# Patient Record
Sex: MALE | Race: ASIAN | NOT HISPANIC OR LATINO | URBAN - METROPOLITAN AREA
[De-identification: names, ages, dates, MRNs, and addresses within clinical notes are randomized per-mention and may not be internally consistent; named-entity substitution may affect disease eponyms.]

---

## 2017-05-19 ENCOUNTER — EMERGENCY (EMERGENCY)
Facility: HOSPITAL | Age: 32
LOS: 0 days | Discharge: HOME | End: 2017-05-19
Attending: EMERGENCY MEDICINE
Payer: MEDICAID

## 2017-05-19 VITALS
SYSTOLIC BLOOD PRESSURE: 100 MMHG | DIASTOLIC BLOOD PRESSURE: 46 MMHG | HEART RATE: 87 BPM | TEMPERATURE: 99 F | RESPIRATION RATE: 18 BRPM | OXYGEN SATURATION: 98 %

## 2017-05-19 VITALS
RESPIRATION RATE: 18 BRPM | HEIGHT: 70 IN | WEIGHT: 175.05 LBS | HEART RATE: 118 BPM | SYSTOLIC BLOOD PRESSURE: 125 MMHG | TEMPERATURE: 102 F | DIASTOLIC BLOOD PRESSURE: 75 MMHG | OXYGEN SATURATION: 98 %

## 2017-05-19 DIAGNOSIS — R52 PAIN, UNSPECIFIED: ICD-10-CM

## 2017-05-19 DIAGNOSIS — J11.1 INFLUENZA DUE TO UNIDENTIFIED INFLUENZA VIRUS WITH OTHER RESPIRATORY MANIFESTATIONS: ICD-10-CM

## 2017-05-19 DIAGNOSIS — R50.9 FEVER, UNSPECIFIED: ICD-10-CM

## 2017-05-19 DIAGNOSIS — R51 HEADACHE: ICD-10-CM

## 2017-05-19 DIAGNOSIS — J06.9 ACUTE UPPER RESPIRATORY INFECTION, UNSPECIFIED: ICD-10-CM

## 2017-05-19 LAB
ALBUMIN SERPL ELPH-MCNC: 3.8 G/DL — SIGNIFICANT CHANGE UP (ref 3.3–5)
ALP SERPL-CCNC: 59 U/L — SIGNIFICANT CHANGE UP (ref 40–120)
ALT FLD-CCNC: 23 U/L — SIGNIFICANT CHANGE UP (ref 12–78)
ANION GAP SERPL CALC-SCNC: 9 MMOL/L — SIGNIFICANT CHANGE UP (ref 5–17)
APPEARANCE UR: CLEAR — SIGNIFICANT CHANGE UP
APTT BLD: 33.3 SEC — SIGNIFICANT CHANGE UP (ref 27.5–37.4)
AST SERPL-CCNC: 20 U/L — SIGNIFICANT CHANGE UP (ref 15–37)
BACTERIA # UR AUTO: ABNORMAL
BASOPHILS # BLD AUTO: 0.1 K/UL — SIGNIFICANT CHANGE UP (ref 0–0.2)
BASOPHILS NFR BLD AUTO: 0.8 % — SIGNIFICANT CHANGE UP (ref 0–2)
BILIRUB SERPL-MCNC: 0.4 MG/DL — SIGNIFICANT CHANGE UP (ref 0.2–1.2)
BILIRUB UR-MCNC: NEGATIVE — SIGNIFICANT CHANGE UP
BLD GP AB SCN SERPL QL: SIGNIFICANT CHANGE UP
BUN SERPL-MCNC: 10 MG/DL — SIGNIFICANT CHANGE UP (ref 7–23)
CALCIUM SERPL-MCNC: 8.1 MG/DL — LOW (ref 8.5–10.1)
CHLORIDE SERPL-SCNC: 106 MMOL/L — SIGNIFICANT CHANGE UP (ref 96–108)
CK MB BLD-MCNC: 0.6 % — SIGNIFICANT CHANGE UP (ref 0–3.5)
CK MB CFR SERPL CALC: 0.7 NG/ML — SIGNIFICANT CHANGE UP (ref 0.5–3.6)
CK SERPL-CCNC: 108 U/L — SIGNIFICANT CHANGE UP (ref 26–308)
CO2 SERPL-SCNC: 26 MMOL/L — SIGNIFICANT CHANGE UP (ref 22–31)
COLOR SPEC: YELLOW — SIGNIFICANT CHANGE UP
CREAT SERPL-MCNC: 1 MG/DL — SIGNIFICANT CHANGE UP (ref 0.5–1.3)
DIFF PNL FLD: ABNORMAL
EOSINOPHIL # BLD AUTO: 0.1 K/UL — SIGNIFICANT CHANGE UP (ref 0–0.5)
EOSINOPHIL NFR BLD AUTO: 1.8 % — SIGNIFICANT CHANGE UP (ref 0–6)
FLUAV SPEC QL CULT: NEGATIVE — SIGNIFICANT CHANGE UP
FLUBV AG SPEC QL IA: NEGATIVE — SIGNIFICANT CHANGE UP
GLUCOSE SERPL-MCNC: 107 MG/DL — HIGH (ref 70–99)
GLUCOSE UR QL: NEGATIVE MG/DL — SIGNIFICANT CHANGE UP
HCT VFR BLD CALC: 36.6 % — LOW (ref 39–50)
HGB BLD-MCNC: 13.6 G/DL — SIGNIFICANT CHANGE UP (ref 13–17)
INR BLD: 1.18 RATIO — HIGH (ref 0.88–1.16)
KETONES UR-MCNC: NEGATIVE — SIGNIFICANT CHANGE UP
LACTATE SERPL-SCNC: 0.7 MMOL/L — SIGNIFICANT CHANGE UP (ref 0.7–2)
LEUKOCYTE ESTERASE UR-ACNC: NEGATIVE — SIGNIFICANT CHANGE UP
LIDOCAIN IGE QN: 141 U/L — SIGNIFICANT CHANGE UP (ref 73–393)
LYMPHOCYTES # BLD AUTO: 0.6 K/UL — LOW (ref 1–3.3)
LYMPHOCYTES # BLD AUTO: 9 % — LOW (ref 13–44)
MCHC RBC-ENTMCNC: 32.3 PG — SIGNIFICANT CHANGE UP (ref 27–34)
MCHC RBC-ENTMCNC: 37.2 GM/DL — HIGH (ref 32–36)
MCV RBC AUTO: 86.7 FL — SIGNIFICANT CHANGE UP (ref 80–100)
MONOCYTES # BLD AUTO: 0.6 K/UL — SIGNIFICANT CHANGE UP (ref 0–0.9)
MONOCYTES NFR BLD AUTO: 8.8 % — SIGNIFICANT CHANGE UP (ref 2–14)
NEUTROPHILS # BLD AUTO: 5.5 K/UL — SIGNIFICANT CHANGE UP (ref 1.8–7.4)
NEUTROPHILS NFR BLD AUTO: 79.5 % — HIGH (ref 43–77)
NITRITE UR-MCNC: NEGATIVE — SIGNIFICANT CHANGE UP
PH UR: 5 — SIGNIFICANT CHANGE UP (ref 5–8)
PLATELET # BLD AUTO: 146 K/UL — LOW (ref 150–400)
POTASSIUM SERPL-MCNC: 3.6 MMOL/L — SIGNIFICANT CHANGE UP (ref 3.5–5.3)
POTASSIUM SERPL-SCNC: 3.6 MMOL/L — SIGNIFICANT CHANGE UP (ref 3.5–5.3)
PROT SERPL-MCNC: 6.9 GM/DL — SIGNIFICANT CHANGE UP (ref 6–8.3)
PROT UR-MCNC: NEGATIVE MG/DL — SIGNIFICANT CHANGE UP
PROTHROM AB SERPL-ACNC: 12.9 SEC — HIGH (ref 9.8–12.7)
RBC # BLD: 4.22 M/UL — SIGNIFICANT CHANGE UP (ref 4.2–5.8)
RBC # FLD: 11.2 % — SIGNIFICANT CHANGE UP (ref 11–15)
RBC CASTS # UR COMP ASSIST: ABNORMAL /HPF (ref 0–4)
SODIUM SERPL-SCNC: 141 MMOL/L — SIGNIFICANT CHANGE UP (ref 135–145)
SP GR SPEC: 1.02 — SIGNIFICANT CHANGE UP (ref 1.01–1.02)
TROPONIN I SERPL-MCNC: <.015 NG/ML — SIGNIFICANT CHANGE UP (ref 0.01–0.04)
UROBILINOGEN FLD QL: NEGATIVE MG/DL — SIGNIFICANT CHANGE UP
WBC # BLD: 7 K/UL — SIGNIFICANT CHANGE UP (ref 3.8–10.5)
WBC # FLD AUTO: 7 K/UL — SIGNIFICANT CHANGE UP (ref 3.8–10.5)

## 2017-05-19 PROCEDURE — 99284 EMERGENCY DEPT VISIT MOD MDM: CPT | Mod: 25

## 2017-05-19 PROCEDURE — 71010: CPT | Mod: 26

## 2017-05-19 RX ORDER — IBUPROFEN 200 MG
1 TABLET ORAL
Qty: 20 | Refills: 0 | OUTPATIENT
Start: 2017-05-19

## 2017-05-19 RX ORDER — ACETAMINOPHEN 500 MG
1 TABLET ORAL
Qty: 20 | Refills: 0 | OUTPATIENT
Start: 2017-05-19

## 2017-05-19 RX ORDER — AZITHROMYCIN 500 MG/1
1 TABLET, FILM COATED ORAL
Qty: 4 | Refills: 0 | OUTPATIENT
Start: 2017-05-19 | End: 2017-05-23

## 2017-05-19 RX ORDER — IBUPROFEN 200 MG
600 TABLET ORAL ONCE
Qty: 0 | Refills: 0 | Status: COMPLETED | OUTPATIENT
Start: 2017-05-19 | End: 2017-05-19

## 2017-05-19 RX ORDER — AZITHROMYCIN 500 MG/1
500 TABLET, FILM COATED ORAL ONCE
Qty: 0 | Refills: 0 | Status: COMPLETED | OUTPATIENT
Start: 2017-05-19 | End: 2017-05-19

## 2017-05-19 RX ORDER — SODIUM CHLORIDE 9 MG/ML
3 INJECTION INTRAMUSCULAR; INTRAVENOUS; SUBCUTANEOUS ONCE
Qty: 0 | Refills: 0 | Status: COMPLETED | OUTPATIENT
Start: 2017-05-19 | End: 2017-05-19

## 2017-05-19 RX ORDER — SODIUM CHLORIDE 9 MG/ML
1000 INJECTION INTRAMUSCULAR; INTRAVENOUS; SUBCUTANEOUS
Qty: 0 | Refills: 0 | Status: COMPLETED | OUTPATIENT
Start: 2017-05-19 | End: 2017-05-19

## 2017-05-19 RX ADMIN — SODIUM CHLORIDE 2000 MILLILITER(S): 9 INJECTION INTRAMUSCULAR; INTRAVENOUS; SUBCUTANEOUS at 04:23

## 2017-05-19 RX ADMIN — AZITHROMYCIN 255 MILLIGRAM(S): 500 TABLET, FILM COATED ORAL at 07:41

## 2017-05-19 RX ADMIN — SODIUM CHLORIDE 3 MILLILITER(S): 9 INJECTION INTRAMUSCULAR; INTRAVENOUS; SUBCUTANEOUS at 03:30

## 2017-05-19 RX ADMIN — SODIUM CHLORIDE 2000 MILLILITER(S): 9 INJECTION INTRAMUSCULAR; INTRAVENOUS; SUBCUTANEOUS at 03:22

## 2017-05-19 RX ADMIN — Medication 75 MILLIGRAM(S): at 07:40

## 2017-05-19 RX ADMIN — Medication 600 MILLIGRAM(S): at 04:22

## 2017-05-19 RX ADMIN — Medication 600 MILLIGRAM(S): at 07:19

## 2017-05-19 NOTE — ED PROVIDER NOTE - OBJECTIVE STATEMENT
31yoM; with no signif pmh; now p/w fever, bodyaches, cough x1 day. denies headache. denies neck stiffness. c/o abd pain--epigastric, cramping, intermittant, associated with n/v (x3-4 episodes). denies diarrhea. denies sick contacts. denies travel.

## 2017-05-19 NOTE — ED ADULT NURSE REASSESSMENT NOTE - NS ED NURSE REASSESS COMMENT FT1
ao x3 , medicated as ordered , d/ salma rack , will be discharge after infusion of antibiotic azithromycin 500mg ivpb infusing. patient stable, smiling , eager to go home. will continue to monitor

## 2017-05-19 NOTE — ED ADULT NURSE NOTE - CAS EDN DISCHARGE ASSESSMENT
No adverse reaction to first time med in ED/Awake/Dressing clean and dry/Alert and oriented to person, place and time/Symptoms improved

## 2017-05-31 ENCOUNTER — EMERGENCY (EMERGENCY)
Facility: HOSPITAL | Age: 32
LOS: 0 days | Discharge: HOME | End: 2017-06-01
Attending: EMERGENCY MEDICINE
Payer: MEDICAID

## 2017-05-31 VITALS
DIASTOLIC BLOOD PRESSURE: 65 MMHG | RESPIRATION RATE: 17 BRPM | HEART RATE: 91 BPM | TEMPERATURE: 100 F | WEIGHT: 175.05 LBS | OXYGEN SATURATION: 100 % | HEIGHT: 70 IN | SYSTOLIC BLOOD PRESSURE: 103 MMHG

## 2017-05-31 PROCEDURE — 99285 EMERGENCY DEPT VISIT HI MDM: CPT | Mod: 25

## 2017-06-01 VITALS
SYSTOLIC BLOOD PRESSURE: 110 MMHG | RESPIRATION RATE: 18 BRPM | TEMPERATURE: 98 F | OXYGEN SATURATION: 99 % | HEART RATE: 72 BPM | DIASTOLIC BLOOD PRESSURE: 72 MMHG

## 2017-06-01 DIAGNOSIS — R50.9 FEVER, UNSPECIFIED: ICD-10-CM

## 2017-06-01 DIAGNOSIS — B34.9 VIRAL INFECTION, UNSPECIFIED: ICD-10-CM

## 2017-06-01 DIAGNOSIS — Z87.891 PERSONAL HISTORY OF NICOTINE DEPENDENCE: ICD-10-CM

## 2017-06-01 DIAGNOSIS — R05 COUGH: ICD-10-CM

## 2017-06-01 DIAGNOSIS — J02.9 ACUTE PHARYNGITIS, UNSPECIFIED: ICD-10-CM

## 2017-06-01 LAB
ALBUMIN SERPL ELPH-MCNC: 4.1 G/DL — SIGNIFICANT CHANGE UP (ref 3.3–5)
ALP SERPL-CCNC: 58 U/L — SIGNIFICANT CHANGE UP (ref 40–120)
ALT FLD-CCNC: 28 U/L — SIGNIFICANT CHANGE UP (ref 12–78)
ANION GAP SERPL CALC-SCNC: 9 MMOL/L — SIGNIFICANT CHANGE UP (ref 5–17)
APPEARANCE CSF: CLEAR — SIGNIFICANT CHANGE UP
APPEARANCE CSF: CLEAR — SIGNIFICANT CHANGE UP
APPEARANCE SPUN FLD: COLORLESS — SIGNIFICANT CHANGE UP
APPEARANCE SPUN FLD: COLORLESS — SIGNIFICANT CHANGE UP
APPEARANCE UR: CLEAR — SIGNIFICANT CHANGE UP
APTT BLD: 34.7 SEC — SIGNIFICANT CHANGE UP (ref 27.5–37.4)
AST SERPL-CCNC: 18 U/L — SIGNIFICANT CHANGE UP (ref 15–37)
BACTERIA # UR AUTO: ABNORMAL
BASOPHILS # BLD AUTO: 0.1 K/UL — SIGNIFICANT CHANGE UP (ref 0–0.2)
BASOPHILS NFR BLD AUTO: 0.7 % — SIGNIFICANT CHANGE UP (ref 0–2)
BILIRUB SERPL-MCNC: 0.7 MG/DL — SIGNIFICANT CHANGE UP (ref 0.2–1.2)
BILIRUB UR-MCNC: NEGATIVE — SIGNIFICANT CHANGE UP
BUN SERPL-MCNC: 8 MG/DL — SIGNIFICANT CHANGE UP (ref 7–23)
CALCIUM SERPL-MCNC: 8.6 MG/DL — SIGNIFICANT CHANGE UP (ref 8.5–10.1)
CHLORIDE SERPL-SCNC: 105 MMOL/L — SIGNIFICANT CHANGE UP (ref 96–108)
CO2 SERPL-SCNC: 27 MMOL/L — SIGNIFICANT CHANGE UP (ref 22–31)
COLOR CSF: SIGNIFICANT CHANGE UP
COLOR CSF: SIGNIFICANT CHANGE UP
COLOR SPEC: YELLOW — SIGNIFICANT CHANGE UP
COMMENT - URINE: SIGNIFICANT CHANGE UP
CREAT SERPL-MCNC: 0.91 MG/DL — SIGNIFICANT CHANGE UP (ref 0.5–1.3)
DIFF PNL FLD: ABNORMAL
EOSINOPHIL # BLD AUTO: 0.1 K/UL — SIGNIFICANT CHANGE UP (ref 0–0.5)
EOSINOPHIL NFR BLD AUTO: 0.7 % — SIGNIFICANT CHANGE UP (ref 0–6)
EPI CELLS # UR: SIGNIFICANT CHANGE UP
GLUCOSE CSF-MCNC: 69 MG/DL — SIGNIFICANT CHANGE UP (ref 40–70)
GLUCOSE SERPL-MCNC: 97 MG/DL — SIGNIFICANT CHANGE UP (ref 70–99)
GLUCOSE UR QL: NEGATIVE MG/DL — SIGNIFICANT CHANGE UP
GRAM STN FLD: SIGNIFICANT CHANGE UP
HCT VFR BLD CALC: 33.9 % — LOW (ref 39–50)
HGB BLD-MCNC: 12.4 G/DL — LOW (ref 13–17)
HYALINE CASTS # UR AUTO: ABNORMAL /LPF
INR BLD: 1.18 RATIO — HIGH (ref 0.88–1.16)
KETONES UR-MCNC: NEGATIVE — SIGNIFICANT CHANGE UP
LACTATE SERPL-SCNC: 0.7 MMOL/L — SIGNIFICANT CHANGE UP (ref 0.7–2)
LDH CSF L TO P-CCNC: 1 U/L — SIGNIFICANT CHANGE UP
LDH FLD-CCNC: 1 U/L — SIGNIFICANT CHANGE UP
LEUKOCYTE ESTERASE UR-ACNC: NEGATIVE — SIGNIFICANT CHANGE UP
LYMPHOCYTES # BLD AUTO: 1.1 K/UL — SIGNIFICANT CHANGE UP (ref 1–3.3)
LYMPHOCYTES # BLD AUTO: 12.4 % — LOW (ref 13–44)
MCHC RBC-ENTMCNC: 31.6 PG — SIGNIFICANT CHANGE UP (ref 27–34)
MCHC RBC-ENTMCNC: 36.5 GM/DL — HIGH (ref 32–36)
MCV RBC AUTO: 86.7 FL — SIGNIFICANT CHANGE UP (ref 80–100)
MONOCYTES # BLD AUTO: 0.9 K/UL — SIGNIFICANT CHANGE UP (ref 0–0.9)
MONOCYTES NFR BLD AUTO: 10.3 % — SIGNIFICANT CHANGE UP (ref 2–14)
NEUTROPHILS # BLD AUTO: 6.8 K/UL — SIGNIFICANT CHANGE UP (ref 1.8–7.4)
NEUTROPHILS # CSF: 0 % — SIGNIFICANT CHANGE UP (ref 0–6)
NEUTROPHILS # CSF: 0 % — SIGNIFICANT CHANGE UP (ref 0–6)
NEUTROPHILS NFR BLD AUTO: 75.9 % — SIGNIFICANT CHANGE UP (ref 43–77)
NIGHT BLUE STAIN TISS: SIGNIFICANT CHANGE UP
NITRITE UR-MCNC: NEGATIVE — SIGNIFICANT CHANGE UP
NRBC NFR CSF: 0 /UL — SIGNIFICANT CHANGE UP (ref 0–5)
NRBC NFR CSF: 0 /UL — SIGNIFICANT CHANGE UP (ref 0–5)
PH UR: 6 — SIGNIFICANT CHANGE UP (ref 5–8)
PLATELET # BLD AUTO: 142 K/UL — LOW (ref 150–400)
POTASSIUM SERPL-MCNC: 4 MMOL/L — SIGNIFICANT CHANGE UP (ref 3.5–5.3)
POTASSIUM SERPL-SCNC: 4 MMOL/L — SIGNIFICANT CHANGE UP (ref 3.5–5.3)
PROT CSF-MCNC: 30 MG/DL — SIGNIFICANT CHANGE UP (ref 15–45)
PROT SERPL-MCNC: 7.8 GM/DL — SIGNIFICANT CHANGE UP (ref 6–8.3)
PROT UR-MCNC: NEGATIVE MG/DL — SIGNIFICANT CHANGE UP
PROTHROM AB SERPL-ACNC: 12.9 SEC — HIGH (ref 9.8–12.7)
RBC # BLD: 3.91 M/UL — LOW (ref 4.2–5.8)
RBC # CSF: 1 /UL — HIGH (ref 0–0)
RBC # CSF: 3 /UL — HIGH (ref 0–0)
RBC # FLD: 11.3 % — SIGNIFICANT CHANGE UP (ref 11–15)
RBC CASTS # UR COMP ASSIST: ABNORMAL /HPF (ref 0–4)
SODIUM SERPL-SCNC: 141 MMOL/L — SIGNIFICANT CHANGE UP (ref 135–145)
SP GR SPEC: 1.01 — SIGNIFICANT CHANGE UP (ref 1.01–1.02)
SPECIMEN SOURCE: SIGNIFICANT CHANGE UP
SPECIMEN SOURCE: SIGNIFICANT CHANGE UP
TUBE TYPE: SIGNIFICANT CHANGE UP
TUBE TYPE: SIGNIFICANT CHANGE UP
UROBILINOGEN FLD QL: NEGATIVE MG/DL — SIGNIFICANT CHANGE UP
WBC # BLD: 8.9 K/UL — SIGNIFICANT CHANGE UP (ref 3.8–10.5)
WBC # FLD AUTO: 8.9 K/UL — SIGNIFICANT CHANGE UP (ref 3.8–10.5)
WBC UR QL: SIGNIFICANT CHANGE UP

## 2017-06-01 PROCEDURE — 70450 CT HEAD/BRAIN W/O DYE: CPT | Mod: 26

## 2017-06-01 PROCEDURE — 71010: CPT | Mod: 26

## 2017-06-01 RX ORDER — SODIUM CHLORIDE 9 MG/ML
3 INJECTION INTRAMUSCULAR; INTRAVENOUS; SUBCUTANEOUS ONCE
Qty: 0 | Refills: 0 | Status: COMPLETED | OUTPATIENT
Start: 2017-06-01 | End: 2017-06-01

## 2017-06-01 RX ORDER — SODIUM CHLORIDE 9 MG/ML
1000 INJECTION INTRAMUSCULAR; INTRAVENOUS; SUBCUTANEOUS
Qty: 0 | Refills: 0 | Status: COMPLETED | OUTPATIENT
Start: 2017-06-01 | End: 2017-06-01

## 2017-06-01 RX ORDER — CYCLOBENZAPRINE HYDROCHLORIDE 10 MG/1
5 TABLET, FILM COATED ORAL ONCE
Qty: 0 | Refills: 0 | Status: COMPLETED | OUTPATIENT
Start: 2017-06-01 | End: 2017-06-01

## 2017-06-01 RX ORDER — SODIUM CHLORIDE 9 MG/ML
1000 INJECTION INTRAMUSCULAR; INTRAVENOUS; SUBCUTANEOUS ONCE
Qty: 0 | Refills: 0 | Status: COMPLETED | OUTPATIENT
Start: 2017-06-01 | End: 2017-06-01

## 2017-06-01 RX ADMIN — SODIUM CHLORIDE 2000 MILLILITER(S): 9 INJECTION INTRAMUSCULAR; INTRAVENOUS; SUBCUTANEOUS at 02:40

## 2017-06-01 RX ADMIN — SODIUM CHLORIDE 3 MILLILITER(S): 9 INJECTION INTRAMUSCULAR; INTRAVENOUS; SUBCUTANEOUS at 02:29

## 2017-06-01 RX ADMIN — SODIUM CHLORIDE 2000 MILLILITER(S): 9 INJECTION INTRAMUSCULAR; INTRAVENOUS; SUBCUTANEOUS at 07:10

## 2017-06-01 RX ADMIN — CYCLOBENZAPRINE HYDROCHLORIDE 5 MILLIGRAM(S): 10 TABLET, FILM COATED ORAL at 09:19

## 2017-06-01 RX ADMIN — SODIUM CHLORIDE 2000 MILLILITER(S): 9 INJECTION INTRAMUSCULAR; INTRAVENOUS; SUBCUTANEOUS at 01:39

## 2017-06-01 NOTE — ED ADULT NURSE NOTE - OBJECTIVE STATEMENT
Pt is 30 y/o male denies pmh c/o of fever/chills, body aches/ weakness sore throat and neck stiffness ( positive nuclear rigidity.)

## 2017-06-01 NOTE — ED PROVIDER NOTE - PHYSICAL EXAMINATION
Gen: Alert, NAD  Head: NC, AT, PERRL, EOMI, normal lids/conjunctiva  ENT: B TM WNL, normal hearing, patent oropharynx without erythema/exudate, uvula midline  Neck: +supple, no tenderness/meningismus/JVD, +Trachea midline  Pulm: Bilateral BS, normal resp effort, no wheeze/stridor/retractions  CV: RRR, no M/R/G, +dist pulses  Abd: soft, NT/ND, +BS, no hepatosplenomegaly  Mskel: no edema/erythema/cyanosis  Skin: no rash  Neuro: AAOx3, no sensory/motor deficit

## 2017-06-01 NOTE — ED PROVIDER NOTE - OBJECTIVE STATEMENT
31yoM; with no signif pmh; now p/w fever, bodyaches, sore throat, cough, intermittent, x2-3 weeks. c/o headache and and bodyaches. denies n/v/d. denies photophobia. denies sick contacts. traveled to [place] 1 month ago. denies trauma.

## 2017-06-02 LAB
CRYPTOC AG CSF-ACNC: NEGATIVE — SIGNIFICANT CHANGE UP
M TB TUBERC IFN-G BLD QL: 0 IU/ML — SIGNIFICANT CHANGE UP
M TB TUBERC IFN-G BLD QL: 0.08 IU/ML — SIGNIFICANT CHANGE UP
M TB TUBERC IFN-G BLD QL: NEGATIVE — SIGNIFICANT CHANGE UP
MITOGEN IGNF BCKGRD COR BLD-ACNC: >10 IU/ML — SIGNIFICANT CHANGE UP

## 2017-06-03 LAB — B BURGDOR DNA SPEC QL NAA+PROBE: NEGATIVE — SIGNIFICANT CHANGE UP

## 2017-06-04 LAB
CULTURE RESULTS: NO GROWTH — SIGNIFICANT CHANGE UP
GRAM STN FLD: SIGNIFICANT CHANGE UP
SPECIMEN SOURCE: SIGNIFICANT CHANGE UP
VDRL CSF-TITR: NEGATIVE — SIGNIFICANT CHANGE UP

## 2017-06-05 LAB
WNV IGG CSF IA-ACNC: NEGATIVE — SIGNIFICANT CHANGE UP
WNV IGM CSF IA-ACNC: NEGATIVE — SIGNIFICANT CHANGE UP

## 2017-06-06 LAB
CULTURE RESULTS: SIGNIFICANT CHANGE UP
CULTURE RESULTS: SIGNIFICANT CHANGE UP
SPECIMEN SOURCE: SIGNIFICANT CHANGE UP
SPECIMEN SOURCE: SIGNIFICANT CHANGE UP

## 2017-07-01 LAB
CULTURE RESULTS: SIGNIFICANT CHANGE UP
SPECIMEN SOURCE: SIGNIFICANT CHANGE UP

## 2017-07-15 LAB
CULTURE RESULTS: SIGNIFICANT CHANGE UP
NIGHT BLUE STAIN TISS: SIGNIFICANT CHANGE UP
SPECIMEN SOURCE: SIGNIFICANT CHANGE UP

## 2017-09-05 NOTE — ED ADULT TRIAGE NOTE - BP NONINVASIVE SYSTOLIC (MM HG)
Quality 131: Pain Assessment And Follow-Up: Pain assessment using a standardized tool is documented as negative, no follow-up plan required
Detail Level: Simple
Quality 130: Documentation Of Current Medications In The Medical Record: Current Medications Documented
Quality 110: Preventive Care And Screening: Influenza Immunization: Influenza Immunization previously received during influenza season
Quality 111:Pneumonia Vaccination Status For Older Adults: Pneumococcal Vaccination not Administered or Previously Received, Reason not Otherwise Specified
Quality 226: Preventive Care And Screening: Tobacco Use: Screening And Cessation Intervention: Patient screened for tobacco and is an ex-smoker
Additional Notes: PQRS #47: It is the patients' responsibility to provide copies of the documents to the front staff to have scanned into their charts. \\nPQRS #110 and #111: Homar advises you to speak with your primary care physician in regards to flu shots and/or pneumonia vaccinations. \\nPQRS #226: oHmarse advises you to reach out for support from the Centers for Disease Control and Prevention (CDC) on providing resources for smoking cessation programs or speak with your primary care physician. \\nPQRS #131: Homarse advises you to speak to your primary care provider for further evaluation.
Quality 47: Advance Care Plan: Advance Care Planning discussed and documented in the medical record; patient did not wish or was not able to name a surrogate decision maker or provide an advance care plan.
103

## 2020-01-24 NOTE — ED ADULT TRIAGE NOTE - HEART RATE (BEATS/MIN)
Phase II:  1. Patient is identified using name and the date of birth. 2.  The patient is free from signs and symptoms of chemical, electrical, laser, radiation, positioning, or transfer/transport injury. 3.  The patient receives appropriate medication(s), safely administered during the Perioperative period. 4.  The patient has wound/tissue perfusion consistent with or improved from baseline levels established preoperatively. 5.  The patient is at or returning to normothermia at the conclusion of the immediate postoperative period. 6.  The patient's fluid, electrolyte, and acid base balances are consistent with or improved from baseline levels established preoperatively. 7.  The patient's pulmonary function is consistent with or improved from baseline levels established preoperatively. 8.  The patient's cardiovascular status is consistent with or improved from baseline levels established preoperatively. 9.  The patient/caregiver demonstrates knowledge of nutritional management related to the operative or other invasive procedure. 10. The patient/caregiver demonstrates knowledge of medication, pain, and wound management. 11. The patient participates in the rehabilitation process as applicable. 12.  The patient/caregiver participates in decisions affection his or her Perioperative plan of care. 13.  The patient's care is consistent with the individualized Perioperative plan of care. 14.  The patient's right to privacy is maintained. 15. The patient is the recipient of competent and ethical care within legal standards of practice. 16.  The patient's value system, lifestyle, ethnicity, and culture are considered, respected, and incorporated in the Perioperative plan of care and understands special services available. 17.  The patient demonstrates and/or reports adequate pain control throughout the the Perioperative period. 18.   The patient's neurological status is consistent with or improved from baseline levels established preoperatively. 23.  The patient/caregiver demonstrates knowledge of the expected responses to the operative or invasive procedure. 20.  Patient/Caregiver has reduced anxiety. Interventions- Familiarize with environment and equipment.   21. Other:  22. Other: 91

## 2022-05-17 NOTE — ED ADULT NURSE NOTE - PMH
Chief Complaint:  Patient is a 84y old  Male who presents with a chief complaint of Abdominal pain (17 May 2022 12:50)      HPI:    83 y/o man with CAD (s/p ALEXIS in 2016, on DAPT), HTN, hyperlipidemia and diverticulosis presents to the ED for abnormal outpatient CT  ordered for lower abdominal pain by PMD. CT A/P revealed "ischemia" later demonstrated as narrowing of SMA and celiac artery due to atherosclerotic plaque with segmental bowel wall thickening in descending colon. Patient reports cramping pain localized to lower abdomen starting 1 weeks ago followed by non-bloody BM. Abdominal pain reoccurred each night, approx. 4-6 hours after patient last ate food. Despite despite urge to defecate with onset of abdominal pain, patient has not had any further BMs. With the exception of initial episode of pain, patient has not had any vomiting. He denies diarrhea, fever, rectal bleeding prior to onset of pain episodes.       Allergies:  No Known Allergies      Home Medications: None documented    Hospital Medications:  allopurinol 100 milliGRAM(s) Oral daily  aspirin enteric coated 81 milliGRAM(s) Oral daily  atorvastatin 80 milliGRAM(s) Oral at bedtime  clopidogrel Tablet 75 milliGRAM(s) Oral daily  enoxaparin Injectable 40 milliGRAM(s) SubCutaneous every 24 hours  lisinopril 5 milliGRAM(s) Oral daily  metoprolol succinate ER 50 milliGRAM(s) Oral daily  polyethylene glycol 3350 17 Gram(s) Oral daily  senna 2 Tablet(s) Oral at bedtime  sodium chloride 0.9% lock flush 3 milliLiter(s) IV Push every 8 hours  tamsulosin 0.8 milliGRAM(s) Oral at bedtime      PMHX/PSHX:  CAD (coronary artery disease)    Hyperlipidemia    Gout    BPH (benign prostatic hyperplasia)    History of hand surgery    Stented coronary artery        Family history:  FH: CAD (coronary artery disease)        Social History:     ROS:     General:  No weight loss, fevers, chills, night sweats, fatigue  Eyes:  No vision changes, no yellowing of eyes   ENT:  No throat pain, runny nose  CV:  No chest pain, palpitations  Resp:  No SOB, cough, wheezing  GI:  See HPI  :  No burning with urination, no hematuria   Muscle:  No muscle pain, weakness  Neuro:  No numbness/tingling, memory problems  Psych:  No fatigue, insomnia, mood problems  Heme:  No easy bruisability  Skin:  No rash, itching       PHYSICAL EXAM:     GENERAL:  Elderly, well developed, well-nourished, no distress  HEENT:  Cnjunctivae clear and pink,  no JVD  CHEST:  No increased effort, breath sounds clear  HEART:  Regular rhythm & rate   ABDOMEN:  Soft, non-tender, non-distended  EXTREMITIES:  no LE cesia  SKIN:  No rash/erythema/ecchymoses/petechiae/jaundice   NEURO:  Alert, orientedx3    Vital Signs:  Vital Signs Last 24 Hrs  T(C): 36.4 (17 May 2022 09:00), Max: 36.7 (16 May 2022 16:46)  T(F): 97.5 (17 May 2022 09:00), Max: 98.1 (16 May 2022 16:46)  HR: 66 (17 May 2022 09:00) (64 - 79)  BP: 153/60 (17 May 2022 09:00) (121/85 - 220/124)  BP(mean): --  RR: 18 (17 May 2022 09:00) (17 - 18)  SpO2: 100% (17 May 2022 09:00) (96% - 100%)  Daily Height in cm: 167.64 (17 May 2022 01:32)    Daily     LABS:                        14.4   7.76  )-----------( 344      ( 17 May 2022 06:12 )             43.4     05-17    139  |  102  |  16  ----------------------------<  87  4.6   |  26  |  1.05    Ca    9.4      17 May 2022 06:12  Phos  3.2     05-17  Mg     1.90     05-17    TPro  7.2  /  Alb  4.6  /  TBili  0.7  /  DBili  x   /  AST  28  /  ALT  23  /  AlkPhos  72  05-16    LIVER FUNCTIONS - ( 16 May 2022 18:00 )  Alb: 4.6 g/dL / Pro: 7.2 g/dL / ALK PHOS: 72 U/L / ALT: 23 U/L / AST: 28 U/L / GGT: x           PT/INR - ( 16 May 2022 18:00 )   PT: 12.4 sec;   INR: 1.07 ratio    PTT - ( 16 May 2022 18:00 )  PTT:29.1 sec    Amylase Serum--      Lipase serum19       Ammonia--      Imaging:    < from: CT Angio Abdomen and Pelvis w/ IV Cont (05.16.22 @ 20:37) >  ACC: [id number] EXAM:  CT ANGIO ABD PELV (W)AW IC                          PROCEDURE DATE:  05/16/2022          INTERPRETATION:  CLINICAL INFORMATION: Abdominal pain. Evaluate for   mesenteric ischemia.    COMPARISON: 5/16/2022 at 1:04 PM    CONTRAST/COMPLICATIONS:  IV Contrast: Omnipaque 350  90 cc administered  Oral Contrast: NONE  Complications: None reported at time of study completion    PROCEDURE:  CT Angiography of the Abdomen and Pelvis was performed.  Arterial and venous phases were acquired.  Sagittal and coronal reformats were performed as well as 3D (MIP)   reconstructions.    FINDINGS:  LOWER CHEST: Interstitial changes at the lung bases. Moderate hiatal   hernia.    LIVER: Arterial enhancing lesion in the left hepatic lobe, not visible on   portal venous or prior noncontrast images, likely arterial portal   shunting/transient hepatic attenuation difference.  BILE DUCTS: Normal caliber.  GALLBLADDER: Within normal limits.  SPLEEN: Within normal limits.  PANCREAS: Within normal limits.  ADRENALS: Within normal limits.  KIDNEYS/URETERS: Bilateral renal cysts.    BLADDER: Within normal limits.  REPRODUCTIVE ORGANS: Enlarged prostate    BOWEL: No bowel obstruction. Redemonstration of short segment mild wall   thickening and adjacent fat stranding of the proximal and mid descending   colon. Enhancement of the bowel is normal. No evidence of bowel wall   pneumatosis, mesenteric, or portal venous gas. Appendix is normal.   Diverticulosis, without acute diverticulitis.  PERITONEUM: No ascites.  VESSELS: Atherosclerotic calcifications. Mild nonocclusive narrowing of   the celiac axis origin. SMA and MICHELLE are patent. Portal and mesenteric   veins are patent.  RETROPERITONEUM/LYMPH NODES: No lymphadenopathy.  ABDOMINAL WALL: Fat-containing left inguinal hernia.  BONES: Degenerative changes.    IMPRESSION:  Short segment bowel wall thickening and adjacent fat stranding of the   proximal and mid descending colon corresponding with findings present on   prior examination.    Celiac axis, SMA, and MICHELLE are patent. Portal and mesenteric veins are   patent. No evidence of mesenteric vascular occlusion.        --- End of Report ---        YADIRA HOWELL MD; Attending Radiologist  This document has been electronically signed. May 17 2022  8:53AM               Chief Complaint:  Patient is a 84y old  Male who presents with a chief complaint of Abdominal pain (17 May 2022 12:50)      HPI:    85 y/o man with CAD (s/p ALEXIS in 2016, on DAPT), HTN, hyperlipidemia and diverticulosis presents to the ED for abnormal outpatient CT  ordered for lower abdominal pain by PMD. CT A/P revealed "ischemia" later demonstrated as narrowing of SMA and celiac artery due to atherosclerotic plaque with segmental bowel wall thickening in descending colon. Patient reports cramping pain localized to lower abdomen starting 1 weeks ago followed by non-bloody BM. Abdominal pain reoccurred each night, approx. 4-6 hours after patient last ate food. Despite despite urge to defecate with onset of abdominal pain, patient has not had any further BMs. With the exception of initial episode of pain, patient has not had any vomiting. He denies diarrhea, fever, rectal bleeding prior to onset of pain episodes. Reports FamHx of colon CA in brother (diagnosed at age 50) but denies any IBD.       Allergies:  No Known Allergies      Home Medications: None documented    Hospital Medications:  allopurinol 100 milliGRAM(s) Oral daily  aspirin enteric coated 81 milliGRAM(s) Oral daily  atorvastatin 80 milliGRAM(s) Oral at bedtime  clopidogrel Tablet 75 milliGRAM(s) Oral daily  enoxaparin Injectable 40 milliGRAM(s) SubCutaneous every 24 hours  lisinopril 5 milliGRAM(s) Oral daily  metoprolol succinate ER 50 milliGRAM(s) Oral daily  polyethylene glycol 3350 17 Gram(s) Oral daily  senna 2 Tablet(s) Oral at bedtime  sodium chloride 0.9% lock flush 3 milliLiter(s) IV Push every 8 hours  tamsulosin 0.8 milliGRAM(s) Oral at bedtime      PMHX/PSHX:  CAD (coronary artery disease)    Hyperlipidemia    Gout    BPH (benign prostatic hyperplasia)    History of hand surgery    Stented coronary artery        Family history:  FH: CAD (coronary artery disease)        Social History:     ROS:     General:  No weight loss, fevers, chills, night sweats, fatigue  Eyes:  No vision changes, no yellowing of eyes   ENT:  No throat pain, runny nose  CV:  No chest pain, palpitations  Resp:  No SOB, cough, wheezing  GI:  See HPI  :  No burning with urination, no hematuria   Muscle:  No muscle pain, weakness  Neuro:  No numbness/tingling, memory problems  Psych:  No fatigue, insomnia, mood problems  Heme:  No easy bruisability  Skin:  No rash, itching       PHYSICAL EXAM:     GENERAL:  Elderly, well developed, well-nourished, no distress  HEENT:  Cnjunctivae clear and pink,  no JVD  CHEST:  No increased effort, breath sounds clear  HEART:  Regular rhythm & rate   ABDOMEN:  Soft, non-tender, non-distended  EXTREMITIES:  no LE cesia  SKIN:  No rash/erythema/ecchymoses/petechiae/jaundice   NEURO:  Alert, orientedx3    Vital Signs:  Vital Signs Last 24 Hrs  T(C): 36.4 (17 May 2022 09:00), Max: 36.7 (16 May 2022 16:46)  T(F): 97.5 (17 May 2022 09:00), Max: 98.1 (16 May 2022 16:46)  HR: 66 (17 May 2022 09:00) (64 - 79)  BP: 153/60 (17 May 2022 09:00) (121/85 - 220/124)  BP(mean): --  RR: 18 (17 May 2022 09:00) (17 - 18)  SpO2: 100% (17 May 2022 09:00) (96% - 100%)  Daily Height in cm: 167.64 (17 May 2022 01:32)    Daily     LABS:                        14.4   7.76  )-----------( 344      ( 17 May 2022 06:12 )             43.4     05-17    139  |  102  |  16  ----------------------------<  87  4.6   |  26  |  1.05    Ca    9.4      17 May 2022 06:12  Phos  3.2     05-17  Mg     1.90     05-17    TPro  7.2  /  Alb  4.6  /  TBili  0.7  /  DBili  x   /  AST  28  /  ALT  23  /  AlkPhos  72  05-16    LIVER FUNCTIONS - ( 16 May 2022 18:00 )  Alb: 4.6 g/dL / Pro: 7.2 g/dL / ALK PHOS: 72 U/L / ALT: 23 U/L / AST: 28 U/L / GGT: x           PT/INR - ( 16 May 2022 18:00 )   PT: 12.4 sec;   INR: 1.07 ratio    PTT - ( 16 May 2022 18:00 )  PTT:29.1 sec    Amylase Serum--      Lipase serum19       Ammonia--      Imaging:    < from: CT Angio Abdomen and Pelvis w/ IV Cont (05.16.22 @ 20:37) >  ACC: [id number] EXAM:  CT ANGIO ABD PELV (W)AW IC                          PROCEDURE DATE:  05/16/2022          INTERPRETATION:  CLINICAL INFORMATION: Abdominal pain. Evaluate for   mesenteric ischemia.    COMPARISON: 5/16/2022 at 1:04 PM    CONTRAST/COMPLICATIONS:  IV Contrast: Omnipaque 350  90 cc administered  Oral Contrast: NONE  Complications: None reported at time of study completion    PROCEDURE:  CT Angiography of the Abdomen and Pelvis was performed.  Arterial and venous phases were acquired.  Sagittal and coronal reformats were performed as well as 3D (MIP)   reconstructions.    FINDINGS:  LOWER CHEST: Interstitial changes at the lung bases. Moderate hiatal   hernia.    LIVER: Arterial enhancing lesion in the left hepatic lobe, not visible on   portal venous or prior noncontrast images, likely arterial portal   shunting/transient hepatic attenuation difference.  BILE DUCTS: Normal caliber.  GALLBLADDER: Within normal limits.  SPLEEN: Within normal limits.  PANCREAS: Within normal limits.  ADRENALS: Within normal limits.  KIDNEYS/URETERS: Bilateral renal cysts.    BLADDER: Within normal limits.  REPRODUCTIVE ORGANS: Enlarged prostate    BOWEL: No bowel obstruction. Redemonstration of short segment mild wall   thickening and adjacent fat stranding of the proximal and mid descending   colon. Enhancement of the bowel is normal. No evidence of bowel wall   pneumatosis, mesenteric, or portal venous gas. Appendix is normal.   Diverticulosis, without acute diverticulitis.  PERITONEUM: No ascites.  VESSELS: Atherosclerotic calcifications. Mild nonocclusive narrowing of   the celiac axis origin. SMA and MICHELLE are patent. Portal and mesenteric   veins are patent.  RETROPERITONEUM/LYMPH NODES: No lymphadenopathy.  ABDOMINAL WALL: Fat-containing left inguinal hernia.  BONES: Degenerative changes.    IMPRESSION:  Short segment bowel wall thickening and adjacent fat stranding of the   proximal and mid descending colon corresponding with findings present on   prior examination.    Celiac axis, SMA, and MICHELLE are patent. Portal and mesenteric veins are   patent. No evidence of mesenteric vascular occlusion.        --- End of Report ---        YAIDRA HOWELL MD; Attending Radiologist  This document has been electronically signed. May 17 2022  8:53AM             HPI:    85 y/o man with CAD (s/p ALEXIS in 2016, on DAPT), HTN, hyperlipidemia and diverticulosis presents to the ED for abnormal outpatient CT  ordered for lower abdominal pain by PMD. CT A/P revealed "ischemia" later demonstrated as narrowing of SMA and celiac artery due to atherosclerotic plaque with segmental bowel wall thickening in descending colon. Patient reports cramping pain localized to lower abdomen starting 1 weeks ago followed by non-bloody BM. Abdominal pain reoccurred each night, approx. 4-6 hours after patient last ate food. Despite despite urge to defecate with onset of abdominal pain, patient has not had any further BMs. With the exception of initial episode of pain, patient has not had any vomiting. He denies diarrhea, fever, rectal bleeding prior to onset of pain episodes. Reports FamHx of colon CA in brother (diagnosed at age 50) but denies any IBD.       Allergies:  No Known Allergies      Home Medications: None documented    Hospital Medications:  allopurinol 100 milliGRAM(s) Oral daily  aspirin enteric coated 81 milliGRAM(s) Oral daily  atorvastatin 80 milliGRAM(s) Oral at bedtime  clopidogrel Tablet 75 milliGRAM(s) Oral daily  enoxaparin Injectable 40 milliGRAM(s) SubCutaneous every 24 hours  lisinopril 5 milliGRAM(s) Oral daily  metoprolol succinate ER 50 milliGRAM(s) Oral daily  polyethylene glycol 3350 17 Gram(s) Oral daily  senna 2 Tablet(s) Oral at bedtime  sodium chloride 0.9% lock flush 3 milliLiter(s) IV Push every 8 hours  tamsulosin 0.8 milliGRAM(s) Oral at bedtime      PMHX/PSHX:  CAD (coronary artery disease)    Hyperlipidemia    Gout    BPH (benign prostatic hyperplasia)    History of hand surgery    Stented coronary artery        Family history:  FH: CAD (coronary artery disease)      Social History:     ROS:   14-point ROS reviewed and negative except as per HPI above    PHYSICAL EXAM:     GENERAL:  Elderly, well developed, well-nourished, no distress  HEENT:  Conjunctivae clear and pink,  no JVD; NCAT; oropharynx clear with moist mucous membranes and no mucosal ulcerations  NECK: Trachea midline; FROM, supple, no significant thyromegaly  LUNGS: lungs clear, with normal respiratory effort  CV: RRR, no murmurs  ABDOMEN: Soft, minimal TTP in suprapubic/LLQ, no r/g  EXTREMITIES: No peripheral edema   SKIN: Normal temperature, turgor and texture; no rash  PSYCH: Appropriate affect, alert and oriented to person, place and time  NEURO: No tremor, asterixis     Vital Signs:  Vital Signs Last 24 Hrs  T(C): 36.4 (17 May 2022 09:00), Max: 36.7 (16 May 2022 16:46)  T(F): 97.5 (17 May 2022 09:00), Max: 98.1 (16 May 2022 16:46)  HR: 66 (17 May 2022 09:00) (64 - 79)  BP: 153/60 (17 May 2022 09:00) (121/85 - 220/124)  BP(mean): --  RR: 18 (17 May 2022 09:00) (17 - 18)  SpO2: 100% (17 May 2022 09:00) (96% - 100%)  Daily Height in cm: 167.64 (17 May 2022 01:32)    Daily     LABS:                        14.4   7.76  )-----------( 344      ( 17 May 2022 06:12 )             43.4     05-17    139  |  102  |  16  ----------------------------<  87  4.6   |  26  |  1.05    Ca    9.4      17 May 2022 06:12  Phos  3.2     05-17  Mg     1.90     05-17    TPro  7.2  /  Alb  4.6  /  TBili  0.7  /  DBili  x   /  AST  28  /  ALT  23  /  AlkPhos  72  05-16    LIVER FUNCTIONS - ( 16 May 2022 18:00 )  Alb: 4.6 g/dL / Pro: 7.2 g/dL / ALK PHOS: 72 U/L / ALT: 23 U/L / AST: 28 U/L / GGT: x           PT/INR - ( 16 May 2022 18:00 )   PT: 12.4 sec;   INR: 1.07 ratio    PTT - ( 16 May 2022 18:00 )  PTT:29.1 sec    Amylase Serum--      Lipase serum19       Ammonia--      Imaging:    < from: CT Angio Abdomen and Pelvis w/ IV Cont (05.16.22 @ 20:37) >  ACC: [id number] EXAM:  CT ANGIO ABD PELV (W)AW IC                          PROCEDURE DATE:  05/16/2022          INTERPRETATION:  CLINICAL INFORMATION: Abdominal pain. Evaluate for   mesenteric ischemia.    COMPARISON: 5/16/2022 at 1:04 PM    CONTRAST/COMPLICATIONS:  IV Contrast: Omnipaque 350  90 cc administered  Oral Contrast: NONE  Complications: None reported at time of study completion    PROCEDURE:  CT Angiography of the Abdomen and Pelvis was performed.  Arterial and venous phases were acquired.  Sagittal and coronal reformats were performed as well as 3D (MIP)   reconstructions.    FINDINGS:  LOWER CHEST: Interstitial changes at the lung bases. Moderate hiatal   hernia.    LIVER: Arterial enhancing lesion in the left hepatic lobe, not visible on   portal venous or prior noncontrast images, likely arterial portal   shunting/transient hepatic attenuation difference.  BILE DUCTS: Normal caliber.  GALLBLADDER: Within normal limits.  SPLEEN: Within normal limits.  PANCREAS: Within normal limits.  ADRENALS: Within normal limits.  KIDNEYS/URETERS: Bilateral renal cysts.    BLADDER: Within normal limits.  REPRODUCTIVE ORGANS: Enlarged prostate    BOWEL: No bowel obstruction. Redemonstration of short segment mild wall   thickening and adjacent fat stranding of the proximal and mid descending   colon. Enhancement of the bowel is normal. No evidence of bowel wall   pneumatosis, mesenteric, or portal venous gas. Appendix is normal.   Diverticulosis, without acute diverticulitis.  PERITONEUM: No ascites.  VESSELS: Atherosclerotic calcifications. Mild nonocclusive narrowing of   the celiac axis origin. SMA and MICHELLE are patent. Portal and mesenteric   veins are patent.  RETROPERITONEUM/LYMPH NODES: No lymphadenopathy.  ABDOMINAL WALL: Fat-containing left inguinal hernia.  BONES: Degenerative changes.    IMPRESSION:  Short segment bowel wall thickening and adjacent fat stranding of the   proximal and mid descending colon corresponding with findings present on   prior examination.    Celiac axis, SMA, and MICHELLE are patent. Portal and mesenteric veins are   patent. No evidence of mesenteric vascular occlusion.        --- End of Report ---        YADIRA HOWELL MD; Attending Radiologist  This document has been electronically signed. May 17 2022  8:53AM             No pertinent past medical history

## 2024-04-10 NOTE — ED ADULT NURSE NOTE - [facility] EDP DISCH TYPE
thyromegaly.      Trachea: Trachea and phonation normal.   Cardiovascular:      Pulses: Normal pulses.   Pulmonary:      Effort: Pulmonary effort is normal. No accessory muscle usage or respiratory distress.      Breath sounds: No stridor.   Musculoskeletal:      Cervical back: Full passive range of motion without pain.   Lymphadenopathy:      Head:      Right side of head: No submental or submandibular adenopathy.      Left side of head: No submental or submandibular adenopathy.      Cervical: No cervical adenopathy.      Right cervical: No superficial, deep or posterior cervical adenopathy.     Left cervical: No superficial, deep or posterior cervical adenopathy.   Skin:     General: Skin is warm and dry.   Neurological:      Mental Status: He is alert and oriented to person, place, and time.      Cranial Nerves: No cranial nerve deficit.      Coordination: Coordination normal.      Gait: Gait normal.   Psychiatric:         Thought Content: Thought content normal.       Procedure     Removal Impacted Cerumen    An operating microscope was utilized to visualize the external auditory canals using a 3mm speculum.  Cerumen was removed with curettes and ovalles suctions on both sides.  The tympanic membrane is intact.  No fluid visualized in the middle ear. No complications.    Assessment and Plan     ASSESSMENT/PLAN  Bassam is a very pleasant 83 y.o. male with     1. Impacted cerumen of right ear  Removed cerumen from the right ear under microscopic visualization.  - [facility] REMOVAL IMPACTED CERUMEN INSTRUMENTATION UNILAT    2. Bilateral hearing loss, unspecified hearing loss type  He has a history of chemotherapy and would like an audiogram to compare what it was prior to and after his treatment for his lung cancer.    Follow-up after audiogram is been completed.  Juan A Smith DO  04/12/24        Portions of this note were dictated using Dragon. There may be linguistic errors secondary to the use of this program.   Home